# Patient Record
Sex: FEMALE | Race: OTHER | NOT HISPANIC OR LATINO | URBAN - METROPOLITAN AREA
[De-identification: names, ages, dates, MRNs, and addresses within clinical notes are randomized per-mention and may not be internally consistent; named-entity substitution may affect disease eponyms.]

---

## 2020-08-26 ENCOUNTER — EMERGENCY (EMERGENCY)
Facility: HOSPITAL | Age: 20
LOS: 1 days | Discharge: HOME | End: 2020-08-26
Attending: EMERGENCY MEDICINE | Admitting: EMERGENCY MEDICINE
Payer: MEDICAID

## 2020-08-26 VITALS
WEIGHT: 220.02 LBS | HEART RATE: 134 BPM | HEIGHT: 64 IN | OXYGEN SATURATION: 99 % | RESPIRATION RATE: 18 BRPM | DIASTOLIC BLOOD PRESSURE: 72 MMHG | SYSTOLIC BLOOD PRESSURE: 116 MMHG | TEMPERATURE: 98 F

## 2020-08-26 VITALS
OXYGEN SATURATION: 98 % | SYSTOLIC BLOOD PRESSURE: 125 MMHG | RESPIRATION RATE: 18 BRPM | HEART RATE: 84 BPM | DIASTOLIC BLOOD PRESSURE: 81 MMHG | TEMPERATURE: 98 F

## 2020-08-26 DIAGNOSIS — R55 SYNCOPE AND COLLAPSE: ICD-10-CM

## 2020-08-26 DIAGNOSIS — K76.0 FATTY (CHANGE OF) LIVER, NOT ELSEWHERE CLASSIFIED: ICD-10-CM

## 2020-08-26 LAB
ALBUMIN SERPL ELPH-MCNC: 3.5 G/DL — SIGNIFICANT CHANGE UP (ref 3.4–5)
ALP SERPL-CCNC: 125 U/L — HIGH (ref 40–120)
ALT FLD-CCNC: 979 U/L — HIGH (ref 12–42)
ANION GAP SERPL CALC-SCNC: 11 MMOL/L — SIGNIFICANT CHANGE UP (ref 9–16)
AST SERPL-CCNC: 923 U/L — HIGH (ref 15–37)
BASOPHILS # BLD AUTO: 0.03 K/UL — SIGNIFICANT CHANGE UP (ref 0–0.2)
BASOPHILS NFR BLD AUTO: 0.3 % — SIGNIFICANT CHANGE UP (ref 0–2)
BILIRUB SERPL-MCNC: 0.4 MG/DL — SIGNIFICANT CHANGE UP (ref 0.2–1.2)
BUN SERPL-MCNC: 13 MG/DL — SIGNIFICANT CHANGE UP (ref 7–23)
CALCIUM SERPL-MCNC: 9.3 MG/DL — SIGNIFICANT CHANGE UP (ref 8.5–10.5)
CHLORIDE SERPL-SCNC: 104 MMOL/L — SIGNIFICANT CHANGE UP (ref 96–108)
CO2 SERPL-SCNC: 24 MMOL/L — SIGNIFICANT CHANGE UP (ref 22–31)
CREAT SERPL-MCNC: 0.82 MG/DL — SIGNIFICANT CHANGE UP (ref 0.5–1.3)
D DIMER BLD IA.RAPID-MCNC: <187 NG/ML DDU — SIGNIFICANT CHANGE UP
EOSINOPHIL # BLD AUTO: 0.08 K/UL — SIGNIFICANT CHANGE UP (ref 0–0.5)
EOSINOPHIL NFR BLD AUTO: 0.8 % — SIGNIFICANT CHANGE UP (ref 0–6)
GLUCOSE SERPL-MCNC: 99 MG/DL — SIGNIFICANT CHANGE UP (ref 70–99)
HCG UR QL: NEGATIVE — SIGNIFICANT CHANGE UP
HCT VFR BLD CALC: 39.3 % — SIGNIFICANT CHANGE UP (ref 34.5–45)
HGB BLD-MCNC: 12.8 G/DL — SIGNIFICANT CHANGE UP (ref 11.5–15.5)
IMM GRANULOCYTES NFR BLD AUTO: 0.6 % — SIGNIFICANT CHANGE UP (ref 0–1.5)
LYMPHOCYTES # BLD AUTO: 1.86 K/UL — SIGNIFICANT CHANGE UP (ref 1–3.3)
LYMPHOCYTES # BLD AUTO: 18 % — SIGNIFICANT CHANGE UP (ref 13–44)
MAGNESIUM SERPL-MCNC: 2 MG/DL — SIGNIFICANT CHANGE UP (ref 1.6–2.6)
MCHC RBC-ENTMCNC: 27 PG — SIGNIFICANT CHANGE UP (ref 27–34)
MCHC RBC-ENTMCNC: 32.6 GM/DL — SIGNIFICANT CHANGE UP (ref 32–36)
MCV RBC AUTO: 82.9 FL — SIGNIFICANT CHANGE UP (ref 80–100)
MONOCYTES # BLD AUTO: 0.77 K/UL — SIGNIFICANT CHANGE UP (ref 0–0.9)
MONOCYTES NFR BLD AUTO: 7.4 % — SIGNIFICANT CHANGE UP (ref 2–14)
NEUTROPHILS # BLD AUTO: 7.54 K/UL — HIGH (ref 1.8–7.4)
NEUTROPHILS NFR BLD AUTO: 72.9 % — SIGNIFICANT CHANGE UP (ref 43–77)
NRBC # BLD: 0 /100 WBCS — SIGNIFICANT CHANGE UP (ref 0–0)
PLATELET # BLD AUTO: 448 K/UL — HIGH (ref 150–400)
POTASSIUM SERPL-MCNC: 4.3 MMOL/L — SIGNIFICANT CHANGE UP (ref 3.5–5.3)
POTASSIUM SERPL-SCNC: 4.3 MMOL/L — SIGNIFICANT CHANGE UP (ref 3.5–5.3)
PROT SERPL-MCNC: 8.6 G/DL — HIGH (ref 6.4–8.2)
RBC # BLD: 4.74 M/UL — SIGNIFICANT CHANGE UP (ref 3.8–5.2)
RBC # FLD: 14.1 % — SIGNIFICANT CHANGE UP (ref 10.3–14.5)
SODIUM SERPL-SCNC: 139 MMOL/L — SIGNIFICANT CHANGE UP (ref 132–145)
TROPONIN I SERPL-MCNC: <0.017 NG/ML — LOW (ref 0.02–0.06)
WBC # BLD: 10.34 K/UL — SIGNIFICANT CHANGE UP (ref 3.8–10.5)
WBC # FLD AUTO: 10.34 K/UL — SIGNIFICANT CHANGE UP (ref 3.8–10.5)

## 2020-08-26 PROCEDURE — 71045 X-RAY EXAM CHEST 1 VIEW: CPT | Mod: 26

## 2020-08-26 PROCEDURE — 76705 ECHO EXAM OF ABDOMEN: CPT | Mod: 26

## 2020-08-26 PROCEDURE — 93010 ELECTROCARDIOGRAM REPORT: CPT

## 2020-08-26 PROCEDURE — 99285 EMERGENCY DEPT VISIT HI MDM: CPT | Mod: 25

## 2020-08-26 NOTE — ED PROVIDER NOTE - DIAGNOSTIC INTERPRETATION
ER Physician: Sergey Jimenez  CHEST XRAY INTERPRETATION: lungs clear, heart shadow normal, bony structures intact

## 2020-08-26 NOTE — ED ADULT TRIAGE NOTE - CHIEF COMPLAINT QUOTE
Patient to ED with complaint of syncopal episode pta.  States she may be dehydrated.  Only 1 bottle of water.  Patient tachycardic.

## 2020-08-26 NOTE — ED PROVIDER NOTE - OBJECTIVE STATEMENT
BIBEMS.  States that earlier today she fainted with chest and back pain prior to the incident.  Recalls waking on the floor with pain in her buttock.  Denies h/a or dizziness prior or after the event.  States that this has happened before.  States that she has been in normal state of health prior to this episode.  On antidepressant and OCP.  Denies travel history, prolonged bed rest, or calf pain.  Adopted so she is unaware of her PFHx.  Former smoker.

## 2020-08-26 NOTE — ED PROVIDER NOTE - CLINICAL SUMMARY MEDICAL DECISION MAKING FREE TEXT BOX
syncope preceded by chest and back pain. A rirved with tachycardia which has resolved.  Denies palpitations or chest pain now.  On OCPs and former smoker.  EKG nsr 89.  Clear lungs, no LE edema or calf tenderness. Labs, CXR ordered.

## 2020-08-26 NOTE — ED PROVIDER NOTE - PROGRESS NOTE DETAILS
D-dimer normal, trop wnl, CXR clear.  HR improved since arrival.  Likely vasovagal in etiology.  LFTs elevated and endorsed history of "liver problems".  No RUQ tenderness on exam.  US ordered and evidence of hepatomegaly and hepatic steatosis.  has GI follow up and results discussed with patient.  Conservative management discussed with the patient in detail.  Close PMD follow up encouraged.  Strict ED return instructions discussed in detail and patient given the opportunity to ask any questions about their discharge diagnosis and instructions

## 2020-08-26 NOTE — ED PROVIDER NOTE - PATIENT PORTAL LINK FT
You can access the FollowMyHealth Patient Portal offered by [facility] by registering at the following website: [email or web address]. By joining [employer]’s FollowMyHealth portal, you will also be able to view your health information using other applications (apps) compatible with our system.